# Patient Record
Sex: FEMALE | Race: WHITE
[De-identification: names, ages, dates, MRNs, and addresses within clinical notes are randomized per-mention and may not be internally consistent; named-entity substitution may affect disease eponyms.]

---

## 2021-08-31 ENCOUNTER — HOSPITAL ENCOUNTER (OUTPATIENT)
Dept: HOSPITAL 53 - M PLALAB | Age: 26
End: 2021-08-31
Attending: OBSTETRICS & GYNECOLOGY
Payer: COMMERCIAL

## 2021-08-31 DIAGNOSIS — Z3A.00: ICD-10-CM

## 2021-08-31 DIAGNOSIS — Z34.81: Primary | ICD-10-CM

## 2021-08-31 LAB
HCT VFR BLD AUTO: 37.9 % (ref 36–47)
HCV AB SER QL: 0 INDEX (ref ?–0.8)
HGB BLD-MCNC: 13 G/DL (ref 12–15.5)
HIV 1+2 AB+HIV1 P24 AG SERPL QL IA: NEGATIVE
MCH RBC QN AUTO: 31.3 PG (ref 27–33)
MCHC RBC AUTO-ENTMCNC: 34.3 G/DL (ref 32–36.5)
MCV RBC AUTO: 91.1 FL (ref 80–96)
N GONORRHOEA RRNA SPEC QL NAA+PROBE: NEGATIVE
PLATELET # BLD AUTO: 189 10^3/UL (ref 150–450)
RBC # BLD AUTO: 4.16 10^6/UL (ref 4–5.4)
WBC # BLD AUTO: 9.8 10^3/UL (ref 4–10)

## 2021-11-08 ENCOUNTER — HOSPITAL ENCOUNTER (OUTPATIENT)
Dept: HOSPITAL 53 - M WHC | Age: 26
End: 2021-11-08
Attending: ADVANCED PRACTICE MIDWIFE
Payer: COMMERCIAL

## 2021-11-08 DIAGNOSIS — Z34.92: Primary | ICD-10-CM

## 2021-11-08 DIAGNOSIS — Z3A.19: ICD-10-CM

## 2021-11-09 NOTE — REP
INDICATION:

ANATOMY.



COMPARISON:

None.



TECHNIQUE:

Real-time sonographic evaluation of the gravid uterus performed.



FINDINGS:

Estimated gestational age is19 weeks 6 days, EDC 03/29/2024.  Today's measurements

indicate appropriate growth.



Presentation: Variable

Placenta posterior, grade 0, without evidence of placenta previa.

Fetal heart rate is recorded at 165 beats per minute.

Amniotic fluid is subjectively normal.

Closed cervical length is measured at 3.2 cm.



Biometry chart:



BPD: 45 mm, 19 weeks 5 days, 46th percentile.

HC: 164 mm, 19 weeks 1 days, 29th percentile

AC: 145 mm, 19 weeks 6 days, 49th percentile

Femur length: 31 mm, 19 weeks 5 days, 46th percentile

HC to AC ratio: 1.13, normal range 1.06-1.25.



Estimated fetal weight: 309g, 38th percentile.



Fetal anatomy:



Cranium: Grossly normal

Lateral Ventricles/Choroid Plexus: Grossly normal

Posterior Fossa/Cerebellum: Grossly normal

Nose/lips/profile: Grossly normal

Four chamber heart: Grossly normal

Right ventricular outflow tract: Grossly normal

Left ventricular outflow tract: Grossly normal

Left-sided stomach: Grossly normal

Kidneys: Grossly normal

Bladder: Grossly normal

Cord Insertion: Grossly normal

3 vessel cord: Grossly normal

Spine: Grossly normal



IMPRESSION:

Viable single intrauterine gestation as above.





<Electronically signed by Eugene Pedroza > 11/09/21 [id number]

## 2021-12-22 ENCOUNTER — HOSPITAL ENCOUNTER (OUTPATIENT)
Dept: HOSPITAL 53 - M PLALAB | Age: 26
End: 2021-12-22
Attending: OBSTETRICS & GYNECOLOGY
Payer: COMMERCIAL

## 2021-12-22 DIAGNOSIS — Z34.82: Primary | ICD-10-CM

## 2021-12-22 LAB
HCT VFR BLD AUTO: 32.2 % (ref 36–47)
HGB BLD-MCNC: 10.6 G/DL (ref 12–15.5)
MCH RBC QN AUTO: 32.2 PG (ref 27–33)
MCHC RBC AUTO-ENTMCNC: 32.9 G/DL (ref 32–36.5)
MCV RBC AUTO: 97.9 FL (ref 80–96)
PLATELET # BLD AUTO: 189 10^3/UL (ref 150–450)
RBC # BLD AUTO: 3.29 10^6/UL (ref 4–5.4)
WBC # BLD AUTO: 14.3 10^3/UL (ref 4–10)

## 2021-12-22 PROCEDURE — 86900 BLOOD TYPING SEROLOGIC ABO: CPT

## 2021-12-22 PROCEDURE — 82950 GLUCOSE TEST: CPT

## 2021-12-22 PROCEDURE — 85027 COMPLETE CBC AUTOMATED: CPT

## 2021-12-22 PROCEDURE — 86901 BLOOD TYPING SEROLOGIC RH(D): CPT

## 2021-12-22 PROCEDURE — 36415 COLL VENOUS BLD VENIPUNCTURE: CPT

## 2021-12-22 PROCEDURE — 86850 RBC ANTIBODY SCREEN: CPT

## 2022-02-25 ENCOUNTER — HOSPITAL ENCOUNTER (OUTPATIENT)
Dept: HOSPITAL 53 - M WHC | Age: 27
End: 2022-02-25
Attending: OBSTETRICS & GYNECOLOGY
Payer: COMMERCIAL

## 2022-02-25 DIAGNOSIS — O26.843: Primary | ICD-10-CM

## 2022-02-25 DIAGNOSIS — Z3A.35: ICD-10-CM

## 2022-03-02 ENCOUNTER — HOSPITAL ENCOUNTER (OUTPATIENT)
Dept: HOSPITAL 53 - M SFHCWAGY | Age: 27
End: 2022-03-02
Attending: OBSTETRICS & GYNECOLOGY
Payer: COMMERCIAL

## 2022-03-02 DIAGNOSIS — Z34.93: Primary | ICD-10-CM

## 2022-03-02 DIAGNOSIS — Z3A.00: ICD-10-CM

## 2022-03-02 PROCEDURE — 87081 CULTURE SCREEN ONLY: CPT

## 2022-03-12 ENCOUNTER — HOSPITAL ENCOUNTER (OUTPATIENT)
Dept: HOSPITAL 53 - M LDO | Age: 27
Discharge: HOME | End: 2022-03-12
Attending: OBSTETRICS & GYNECOLOGY
Payer: COMMERCIAL

## 2022-03-12 VITALS — DIASTOLIC BLOOD PRESSURE: 70 MMHG | SYSTOLIC BLOOD PRESSURE: 130 MMHG

## 2022-03-12 VITALS — BODY MASS INDEX: 31.22 KG/M2 | WEIGHT: 187.39 LBS | HEIGHT: 65 IN

## 2022-03-12 VITALS — SYSTOLIC BLOOD PRESSURE: 120 MMHG | DIASTOLIC BLOOD PRESSURE: 64 MMHG

## 2022-03-12 DIAGNOSIS — Z3A.37: ICD-10-CM

## 2022-03-12 DIAGNOSIS — O60.03: Primary | ICD-10-CM

## 2022-03-12 LAB
AMORPH SED URNS QL MICRO: (no result)
APPEARANCE UR: (no result)
BACTERIA UR QL AUTO: NEGATIVE
BILIRUB UR QL STRIP.AUTO: (no result)
GLUCOSE UR QL STRIP.AUTO: NEGATIVE MG/DL
HGB UR QL STRIP.AUTO: (no result)
KETONES UR QL STRIP.AUTO: (no result) MG/DL
LEUKOCYTE ESTERASE UR QL STRIP.AUTO: (no result)
MUCOUS THREADS URNS QL MICRO: (no result)
NITRITE UR QL STRIP.AUTO: NEGATIVE
PH UR STRIP.AUTO: 5 UNITS (ref 5–9)
PROT UR QL STRIP.AUTO: (no result) MG/DL
RBC # UR AUTO: 32 /HPF (ref 0–3)
SP GR UR STRIP.AUTO: 1.03 (ref 1–1.03)
SQUAMOUS #/AREA URNS AUTO: 2 /HPF (ref 0–6)
UROBILINOGEN UR QL STRIP.AUTO: 4 MG/DL (ref 0–2)
WBC #/AREA URNS AUTO: 8 /HPF (ref 0–3)

## 2022-03-12 PROCEDURE — 81001 URINALYSIS AUTO W/SCOPE: CPT

## 2022-03-12 PROCEDURE — 87086 URINE CULTURE/COLONY COUNT: CPT

## 2022-03-12 PROCEDURE — 59025 FETAL NON-STRESS TEST: CPT

## 2022-03-30 ENCOUNTER — HOSPITAL ENCOUNTER (OUTPATIENT)
Dept: HOSPITAL 53 - M LDO | Age: 27
Discharge: HOME | End: 2022-03-30
Attending: ADVANCED PRACTICE MIDWIFE
Payer: COMMERCIAL

## 2022-03-30 VITALS — BODY MASS INDEX: 31.4 KG/M2 | WEIGHT: 188.5 LBS | HEIGHT: 65 IN

## 2022-03-30 VITALS — SYSTOLIC BLOOD PRESSURE: 135 MMHG | DIASTOLIC BLOOD PRESSURE: 82 MMHG

## 2022-03-30 VITALS — SYSTOLIC BLOOD PRESSURE: 131 MMHG | DIASTOLIC BLOOD PRESSURE: 75 MMHG

## 2022-03-30 DIAGNOSIS — O26.03: Primary | ICD-10-CM

## 2022-03-30 DIAGNOSIS — Z3A.40: ICD-10-CM

## 2022-03-30 PROCEDURE — 59025 FETAL NON-STRESS TEST: CPT

## 2022-04-02 ENCOUNTER — HOSPITAL ENCOUNTER (INPATIENT)
Dept: HOSPITAL 53 - M LDI | Age: 27
LOS: 1 days | Discharge: HOME | End: 2022-04-03
Attending: OBSTETRICS & GYNECOLOGY | Admitting: OBSTETRICS & GYNECOLOGY
Payer: COMMERCIAL

## 2022-04-02 VITALS — DIASTOLIC BLOOD PRESSURE: 64 MMHG | SYSTOLIC BLOOD PRESSURE: 130 MMHG

## 2022-04-02 VITALS — DIASTOLIC BLOOD PRESSURE: 59 MMHG | SYSTOLIC BLOOD PRESSURE: 112 MMHG

## 2022-04-02 VITALS — DIASTOLIC BLOOD PRESSURE: 62 MMHG | SYSTOLIC BLOOD PRESSURE: 103 MMHG

## 2022-04-02 VITALS — DIASTOLIC BLOOD PRESSURE: 64 MMHG | SYSTOLIC BLOOD PRESSURE: 141 MMHG

## 2022-04-02 VITALS — SYSTOLIC BLOOD PRESSURE: 120 MMHG | DIASTOLIC BLOOD PRESSURE: 60 MMHG

## 2022-04-02 VITALS — SYSTOLIC BLOOD PRESSURE: 115 MMHG | DIASTOLIC BLOOD PRESSURE: 58 MMHG

## 2022-04-02 VITALS — SYSTOLIC BLOOD PRESSURE: 132 MMHG | DIASTOLIC BLOOD PRESSURE: 71 MMHG

## 2022-04-02 VITALS — SYSTOLIC BLOOD PRESSURE: 122 MMHG | DIASTOLIC BLOOD PRESSURE: 58 MMHG

## 2022-04-02 VITALS — DIASTOLIC BLOOD PRESSURE: 55 MMHG | SYSTOLIC BLOOD PRESSURE: 109 MMHG

## 2022-04-02 VITALS — SYSTOLIC BLOOD PRESSURE: 107 MMHG | DIASTOLIC BLOOD PRESSURE: 56 MMHG

## 2022-04-02 VITALS — SYSTOLIC BLOOD PRESSURE: 106 MMHG | DIASTOLIC BLOOD PRESSURE: 50 MMHG

## 2022-04-02 VITALS — DIASTOLIC BLOOD PRESSURE: 65 MMHG | SYSTOLIC BLOOD PRESSURE: 118 MMHG

## 2022-04-02 VITALS — DIASTOLIC BLOOD PRESSURE: 67 MMHG | SYSTOLIC BLOOD PRESSURE: 113 MMHG

## 2022-04-02 VITALS — DIASTOLIC BLOOD PRESSURE: 58 MMHG | SYSTOLIC BLOOD PRESSURE: 106 MMHG

## 2022-04-02 VITALS — HEIGHT: 65 IN | BODY MASS INDEX: 31.74 KG/M2 | WEIGHT: 190.48 LBS

## 2022-04-02 VITALS — DIASTOLIC BLOOD PRESSURE: 67 MMHG | SYSTOLIC BLOOD PRESSURE: 118 MMHG

## 2022-04-02 VITALS — DIASTOLIC BLOOD PRESSURE: 69 MMHG | SYSTOLIC BLOOD PRESSURE: 112 MMHG

## 2022-04-02 VITALS — DIASTOLIC BLOOD PRESSURE: 71 MMHG | SYSTOLIC BLOOD PRESSURE: 118 MMHG

## 2022-04-02 VITALS — SYSTOLIC BLOOD PRESSURE: 104 MMHG | DIASTOLIC BLOOD PRESSURE: 67 MMHG

## 2022-04-02 VITALS — DIASTOLIC BLOOD PRESSURE: 64 MMHG | SYSTOLIC BLOOD PRESSURE: 116 MMHG

## 2022-04-02 VITALS — SYSTOLIC BLOOD PRESSURE: 121 MMHG | DIASTOLIC BLOOD PRESSURE: 63 MMHG

## 2022-04-02 VITALS — DIASTOLIC BLOOD PRESSURE: 63 MMHG | SYSTOLIC BLOOD PRESSURE: 124 MMHG

## 2022-04-02 VITALS — SYSTOLIC BLOOD PRESSURE: 110 MMHG | DIASTOLIC BLOOD PRESSURE: 71 MMHG

## 2022-04-02 DIAGNOSIS — O48.0: Primary | ICD-10-CM

## 2022-04-02 DIAGNOSIS — Z3A.40: ICD-10-CM

## 2022-04-02 DIAGNOSIS — Z91.030: ICD-10-CM

## 2022-04-02 LAB
HCT VFR BLD AUTO: 32.9 % (ref 36–47)
HGB BLD-MCNC: 11.3 G/DL (ref 12–15.5)
MCH RBC QN AUTO: 32.9 PG (ref 27–33)
MCHC RBC AUTO-ENTMCNC: 34.3 G/DL (ref 32–36.5)
MCV RBC AUTO: 95.9 FL (ref 80–96)
PLATELET # BLD AUTO: 205 10^3/UL (ref 150–450)
RBC # BLD AUTO: 3.43 10^6/UL (ref 4–5.4)
WBC # BLD AUTO: 14.1 10^3/UL (ref 4–10)

## 2022-04-02 PROCEDURE — 3E033VJ INTRODUCTION OF OTHER HORMONE INTO PERIPHERAL VEIN, PERCUTANEOUS APPROACH: ICD-10-PCS | Performed by: OBSTETRICS & GYNECOLOGY

## 2022-04-03 VITALS — SYSTOLIC BLOOD PRESSURE: 116 MMHG | DIASTOLIC BLOOD PRESSURE: 56 MMHG

## 2022-09-14 ENCOUNTER — HOSPITAL ENCOUNTER (OUTPATIENT)
Dept: HOSPITAL 53 - M PLALAB | Age: 27
End: 2022-09-14
Attending: OBSTETRICS & GYNECOLOGY
Payer: COMMERCIAL

## 2022-09-14 DIAGNOSIS — Z12.4: Primary | ICD-10-CM
